# Patient Record
Sex: FEMALE | URBAN - METROPOLITAN AREA
[De-identification: names, ages, dates, MRNs, and addresses within clinical notes are randomized per-mention and may not be internally consistent; named-entity substitution may affect disease eponyms.]

---
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
## 2024-09-03 NOTE — ED PEDIATRIC NURSE NOTE - OBJECTIVE STATEMENT
Pt presents to the ED c/o migraines since today. Pt took 400mg advil pta. Pt with hx of migraines. Opt out

## 2025-06-25 NOTE — ED PEDIATRIC NURSE NOTE - ED STAT RN HANDOFF DETAILS 2
----- Message from Yovani Nair MD sent at 6/25/2025 12:32 PM CDT -----  HbA1C is ok, at 7.0/ the same- should be ok to reduce Ozempic to see if it helps with nausea/ vomiting, but would likely need to be more careful with sugar and carb intake- please inform of pt and send new rx for 1.0 mg dose. thanks  
Informed patient through my sherri of below. Informed patiet to call the office for any further questions.  
Break coverage report received from FINA PACHECO.